# Patient Record
Sex: FEMALE
[De-identification: names, ages, dates, MRNs, and addresses within clinical notes are randomized per-mention and may not be internally consistent; named-entity substitution may affect disease eponyms.]

---

## 2019-08-01 ENCOUNTER — TRANSCRIPTION ENCOUNTER (OUTPATIENT)
Age: 44
End: 2019-08-01

## 2020-09-27 ENCOUNTER — TRANSCRIPTION ENCOUNTER (OUTPATIENT)
Age: 45
End: 2020-09-27

## 2022-01-03 ENCOUNTER — TRANSCRIPTION ENCOUNTER (OUTPATIENT)
Age: 47
End: 2022-01-03

## 2022-09-19 PROBLEM — Z00.00 ENCOUNTER FOR PREVENTIVE HEALTH EXAMINATION: Status: ACTIVE | Noted: 2022-09-19

## 2022-10-06 ENCOUNTER — APPOINTMENT (OUTPATIENT)
Dept: ENDOCRINOLOGY | Facility: CLINIC | Age: 47
End: 2022-10-06

## 2022-10-06 VITALS
SYSTOLIC BLOOD PRESSURE: 120 MMHG | WEIGHT: 128.56 LBS | DIASTOLIC BLOOD PRESSURE: 80 MMHG | HEIGHT: 55 IN | BODY MASS INDEX: 29.75 KG/M2 | HEART RATE: 64 BPM

## 2022-10-06 DIAGNOSIS — Z63.5 DISRUPTION OF FAMILY BY SEPARATION AND DIVORCE: ICD-10-CM

## 2022-10-06 DIAGNOSIS — Z78.9 OTHER SPECIFIED HEALTH STATUS: ICD-10-CM

## 2022-10-06 DIAGNOSIS — E89.2 POSTPROCEDURAL HYPOPARATHYROIDISM: ICD-10-CM

## 2022-10-06 PROCEDURE — 99203 OFFICE O/P NEW LOW 30 MIN: CPT

## 2022-10-06 SDOH — SOCIAL STABILITY - SOCIAL INSECURITY: DISRUPTION OF FAMILY BY SEPARATION AND DIVORCE: Z63.5

## 2022-10-06 NOTE — ASSESSMENT
[Importance of Diet and Exercise] : importance of diet and exercise to improve glycemic control, achieve weight loss and improve cardiovascular health [Other____] : Risks and benefits of [unfilled] was discussed with the patient. [FreeTextEntry1] : ASSESSMENT NOTES\par - weight is stable\par - she had a parathyroidectomy (1 nodule was removed)\par - has a small scar\par - no tremors\par - needs calcium (none taken currently) and vitamin D (2000 units every day)\par - Young  female with a past medical history of hyperparathyroidism secondary to a benign adenoma. This was surgically removed at the [facility] approximately one year ago. Since then the patient is stable, her calcium levels have been rhonda, and she does not take any calcium supplements except for vitamin D. She denies any history of fractures of the bones and she had bone density test done one year ago reported to be normal.\par \par VITALS\par - B.P. = 120/80\par - H.R. = 64 bpm\par - Weight = 128.9\par - Height = 4'4"\par \par PLAN\par - Patient should take calcium (1000 mg daily)\par - Vitamin D3 (2000 IU on a daily basis) \par - Will obtain a copy of her recent blood test performed by her PCP, Dr. Mariusz Cm\par - Fall pre-cautions were discussed with the patient\par - If she remains stable she will follow up in one year\par - Obtain bone density records from  Dr. Whitney

## 2022-10-06 NOTE — ASSESSMENT
[Importance of Diet and Exercise] : importance of diet and exercise to improve glycemic control, achieve weight loss and improve cardiovascular health [Other____] : Risks and benefits of [unfilled] was discussed with the patient. [FreeTextEntry1] : ASSESSMENT NOTES\par - weight is stable\par - she had a parathyroidectomy (1 nodule was removed)\par - has a small scar\par - no tremors\par - needs calcium (none taken currently) and vitamin D (2000 units every day)\par - Young  female with a past medical history of hyperparathyroidism secondary to a benign adenoma. This was surgically removed at the [facility] approximately one year ago. Since then the patient is stable, her calcium levels have been rhonda, and she does not take any calcium supplements except for vitamin D. She denies any history of fractures of the bones and she had bone density test done one year ago reported to be normal.\par \par VITALS\par - B.P. = 120/80\par - H.R. = 64 bpm\par - Weight = 128.9\par - Height = 4'4"\par \par PLAN\par - Patient should take calcium (1000 mg daily)\par - Vitamin D3 (2000 IU on a daily basis) \par - Will obtain a copy of her recent blood test performed by her PCP, Dr. Mariusz Cm\par - Fall pre-cautions were discussed with the patient\par - If she remains stable she will follow up in one year\par - Obtain bone density records from  Dr. Whitney

## 2022-10-06 NOTE — PHYSICAL EXAM
[Alert] : alert [Well Nourished] : well nourished [No Acute Distress] : no acute distress [Well Developed] : well developed [Normal Sclera/Conjunctiva] : normal sclera/conjunctiva [EOMI] : extra ocular movement intact [No Proptosis] : no proptosis [Normal Oropharynx] : the oropharynx was normal [Thyroid Not Enlarged] : the thyroid was not enlarged [No Thyroid Nodules] : no palpable thyroid nodules [No Respiratory Distress] : no respiratory distress [No Accessory Muscle Use] : no accessory muscle use [Clear to Auscultation] : lungs were clear to auscultation bilaterally [Normal S1, S2] : normal S1 and S2 [Normal Rate] : heart rate was normal [Regular Rhythm] : with a regular rhythm [No Edema] : no peripheral edema [Pedal Pulses Normal] : the pedal pulses are present [Normal Bowel Sounds] : normal bowel sounds [Not Tender] : non-tender [Not Distended] : not distended [Soft] : abdomen soft [Normal Posterior Cervical Nodes] : no posterior cervical lymphadenopathy [No Spinal Tenderness] : no spinal tenderness [Spine Straight] : spine straight [No Stigmata of Cushings Syndrome] : no stigmata of Cushings Syndrome [Normal Gait] : normal gait [Normal Strength/Tone] : muscle strength and tone were normal [No Rash] : no rash [Normal Reflexes] : deep tendon reflexes were 2+ and symmetric [No Tremors] : no tremors [Oriented x3] : oriented to person, place, and time [Well Healed Scar] : well healed scar [Normal Supraclavicular Nodes] : no supraclavicular lymphadenopathy [Acanthosis Nigricans] : no acanthosis nigricans [No Sensory Deficits] : the sensory exam was normal to light touch and pinprick [de-identified] : Small  anterior thyroid surgical scar well-healed. [FreeTextEntry1] : Deferred [de-identified] : Deferred

## 2022-10-06 NOTE — HISTORY OF PRESENT ILLNESS
[FreeTextEntry1] : AREA OF CONCERN TODAY\par - had another doctor before (Dr. Katy Mejia)\par - Hyperparathyroid\par - had parathyroid adenoma removed (around June 2021) - surgery done at [place]\par - since removed calcium returned to normal\par - Calcium levels are sometimes down so she needs to have them checked (does not take calcium supplements because calcium levels are constantly fluctuating)\par - no pain\par - slight hair loss\par - no trouble swallowing\par - no pain or numbness in her digits\par - no cramps or muscle aches\par - does not remember doctor who did the surgery\par - called hospital to have records sent to Dr. Patricia\par - no other medical problems\par - had blood test done with Dr. Mariusz Cm in [place] (going Monday for the results)\par - no more menstrual cycle (1 year without)\par - Bone Density done last year results are not known but the patient thinks that she may have osteopenia\par - pain on right side of lumbar - after working hard or sleeping wrong in bed\par \par FAMILY HISTORY\par - mother: hypothyroidism, kidney problems, high cholesterol \par - father: high blood pressure, high cholesterol\par \par SOCIAL HISTORY\par - 3 siblings (1/4)\par - no smoking\par - social drinking\par - no drugs\par - not  ()\par - lives with daughter\par - three children \par - employed (works in bakery)\par \par SURGICAL HISTORY\par - thyroid nodule removal\par \par MEDICAL TEAM\par - PCP: Dr. Mariusz Cm\par \par MEDICATION\par - vitamin D3\par - B12\par \par ALLERGIES\par - seasonal allergies\par - no drug allergies\par

## 2022-10-06 NOTE — PHYSICAL EXAM
[Alert] : alert [Well Nourished] : well nourished [No Acute Distress] : no acute distress [Well Developed] : well developed [Normal Sclera/Conjunctiva] : normal sclera/conjunctiva [EOMI] : extra ocular movement intact [No Proptosis] : no proptosis [Normal Oropharynx] : the oropharynx was normal [Thyroid Not Enlarged] : the thyroid was not enlarged [No Thyroid Nodules] : no palpable thyroid nodules [No Respiratory Distress] : no respiratory distress [No Accessory Muscle Use] : no accessory muscle use [Clear to Auscultation] : lungs were clear to auscultation bilaterally [Normal S1, S2] : normal S1 and S2 [Normal Rate] : heart rate was normal [Regular Rhythm] : with a regular rhythm [No Edema] : no peripheral edema [Pedal Pulses Normal] : the pedal pulses are present [Normal Bowel Sounds] : normal bowel sounds [Not Tender] : non-tender [Not Distended] : not distended [Soft] : abdomen soft [Normal Posterior Cervical Nodes] : no posterior cervical lymphadenopathy [No Spinal Tenderness] : no spinal tenderness [Spine Straight] : spine straight [No Stigmata of Cushings Syndrome] : no stigmata of Cushings Syndrome [Normal Gait] : normal gait [Normal Strength/Tone] : muscle strength and tone were normal [No Rash] : no rash [Normal Reflexes] : deep tendon reflexes were 2+ and symmetric [No Tremors] : no tremors [Oriented x3] : oriented to person, place, and time [Well Healed Scar] : well healed scar [Normal Supraclavicular Nodes] : no supraclavicular lymphadenopathy [Acanthosis Nigricans] : no acanthosis nigricans [No Sensory Deficits] : the sensory exam was normal to light touch and pinprick [de-identified] : Small  anterior thyroid surgical scar well-healed. [FreeTextEntry1] : Deferred [de-identified] : Deferred

## 2022-10-06 NOTE — REVIEW OF SYSTEMS
[Negative] : Heme/Lymph [Back Pain] : back pain [As Noted in HPI] : as noted in HPI [Hair Loss] : hair loss [Shortness Of Breath] : no shortness of breath [Cough] : no cough [SOB on Exertion] : no shortness of breath on exertion [Nausea] : no nausea [Abdominal Pain] : no abdominal pain [Vomiting] : no vomiting [Diarrhea] : no diarrhea [FreeTextEntry9] : patient notes of pain on the right side of his lumbar [de-identified] : patient notes of slight hair loss

## 2022-10-06 NOTE — REVIEW OF SYSTEMS
[Negative] : Heme/Lymph [Back Pain] : back pain [As Noted in HPI] : as noted in HPI [Hair Loss] : hair loss [Shortness Of Breath] : no shortness of breath [Cough] : no cough [SOB on Exertion] : no shortness of breath on exertion [Nausea] : no nausea [Abdominal Pain] : no abdominal pain [Vomiting] : no vomiting [Diarrhea] : no diarrhea [FreeTextEntry9] : patient notes of pain on the right side of his lumbar [de-identified] : patient notes of slight hair loss

## 2022-10-06 NOTE — HISTORY OF PRESENT ILLNESS
[FreeTextEntry1] : AREA OF CONCERN TODAY\par - had another doctor before (Dr. Katy Mejia)\par - Hyperparathyroid\par - had parathyroid adenoma removed (around June 2021) - surgery done at [place]\par - since removed calcium returned to normal\par - Calcium levels are sometimes down so she needs to have them checked (does not take calcium supplements because calcium levels are constantly fluctuating)\par - no pain\par - slight hair loss\par - no trouble swallowing\par - no pain or numbness in her digits\par - no cramps or muscle aches\par - does not remember doctor who did the surgery\par - called hospital to have records sent to Dr. Patricia\par - no other medical problems\par - had blood test done with Dr. Mariusz Cm in [place] (going Monday for the results)\par - no more menstrual cycle (1 year without)\par - Bone Density done last year results are not known but the patient thinks that she may have osteopenia\par - pain on right side of lumbar - after working hard or sleeping wrong in bed\par \par FAMILY HISTORY\par - mother: hypothyroidism, kidney problems, high cholesterol \par - father: high blood pressure, high cholesterol\par \par SOCIAL HISTORY\par - 3 siblings (1/4)\par - no smoking\par - social drinking\par - no drugs\par - not  ()\par - lives with daughter\par - three children \par - employed (works in bakery)\par \par SURGICAL HISTORY\par - thyroid nodule removal\par \par MEDICAL TEAM\par - PCP: Dr. Mariusz Cm\par \par MEDICATION\par - vitamin D3\par - B12\par \par ALLERGIES\par - seasonal allergies\par - no drug allergies\par